# Patient Record
(demographics unavailable — no encounter records)

---

## 2024-12-30 NOTE — HISTORY OF PRESENT ILLNESS
[Right] : right hand dominant [FreeTextEntry1] : DOI: 12/28/24 DIONISIO: Fall on ice   Patient explains that early morning of the 28th he slipped on some ice outside his home falling on to his right hand.  He had immediate pain in the right wrist.  He went to the [facility] emergency department where x-rays revealed an anterior lunate dislocation.  A reduction was attempted by the emergency department physicians.  They report that they were able to feel the fracture reduced however they were unable to hold it reduced in a splint and so it redislocated.  He presents today in a sugar-tong splint.  He reports that he has no numbness or tingling in the hand.  He just has pain and swelling in the wrist.  He reports a history of a triceps tendon avulsion on the right side which was repaired but has no history of surgery in this wrist.

## 2024-12-30 NOTE — PHYSICAL EXAM
[de-identified] :  General: No acute distress Respiratory: Nonlabored Cardiology: Warm and well-perfused  Right upper extremity Sugar-tong splint in place, no sharp edges Swelling of the hand Motor: AIN PIN U intact, median nerve intact as the patient is able to fire the APB, 5 out of 5 Sensation: No numbness or tingling in the hand Warm and well-perfused with brisk cap refill  [de-identified] :  3 views of the right wrist as well as a CT scan of the right wrist were obtained in the [facility] and see department and reviewed in clinic showing an anterior dislocation of the lunate, there are no fractures associated with the injury

## 2024-12-30 NOTE — ASSESSMENT
[FreeTextEntry1] : 70-year-old male with a right volar lunate dislocation    -Discussed diagnosis, natural history of condition, and treatment options -Explained that we need to fix this surgically and we need to do it in an urgent fashion.  For this reason I recommend surgery in the form of open reduction of the lunate dislocation and fixation with K wires.  I also recommend fixation of the associated SL and LT ligament disruptions using anchors and FiberWire sutures.  I explained that we will try to do the surgery through a dorsal only incision.  However if he develops any numbness in the median nerve between now and the date of surgery or if we are unable to reduce the lunate through a dorsal only incision we will then include a volar incision to release the carpal tunnel and help with the reduction of the lunate.  I explained the K wires will be buried underneath the skin and will remain in for 10 to 12 weeks.  He will also be immobilized during this interval.  In 10 to 12 weeks the K wires will be removed and wrist range of motion will be begun.  - Discussed risks, benefits, and alternatives to surgery which included but were not limited to infection, bleeding, wound healing issues, stiffness, need for further surgery, and nerve injury.  I explained that the outcomes after an injury like this are generally not great as some level of stiffness usually results.  Patient understands and would like to proceed. All questions answered.  Plan: Right lunate dislocation open reduction and fixation: Regional and sedation: Date of surgery 12/31/2024

## 2024-12-30 NOTE — PHYSICAL EXAM
[de-identified] :  General: No acute distress Respiratory: Nonlabored Cardiology: Warm and well-perfused  Right upper extremity Sugar-tong splint in place, no sharp edges Swelling of the hand Motor: AIN PIN U intact, median nerve intact as the patient is able to fire the APB, 5 out of 5 Sensation: No numbness or tingling in the hand Warm and well-perfused with brisk cap refill  [de-identified] :  3 views of the right wrist as well as a CT scan of the right wrist were obtained in the [facility] and see department and reviewed in clinic showing an anterior dislocation of the lunate, there are no fractures associated with the injury

## 2024-12-30 NOTE — HISTORY OF PRESENT ILLNESS
[Right] : right hand dominant [FreeTextEntry1] : DOI: 12/28/24 DIONISIO: Fall on ice   Patient explains that early morning of the 28th he slipped on some ice outside his home falling on to his right hand.  He had immediate pain in the right wrist.  He went to the [facility] emergency department where x-rays revealed an anterior lunate dislocation.  A reduction was attempted by the emergency department physicians.  They report that they were able to feel the fracture reduced however they were unable to hold it reduced in a splint and so it redislocated.  He presents today in a sugar-tong splint.  He reports that he has no numbness or tingling in the hand.  He just has pain and swelling in the wrist.  He reports a history of a triceps tendon avulsion on the right side which was repaired but has no history of surgery in this wrist.

## 2025-01-13 NOTE — HISTORY OF PRESENT ILLNESS
[de-identified] : DOS: 1/31/24 Surgery: right lunate dislocation repair  [de-identified] : Patient feels well.  Pain has improved.  He still has swelling in his fingers but is overall improving.  He has no numbness or tingling in the hands. [de-identified] : Splint removed, incision clean dry and intact, nylon sutures removed today, no erythema, expected postoperative swelling of the digits, no numbness or tingling in the median nerve distribution, thenar musculature 5 out of 5 in the APB [de-identified] : 2 views of the right wrist were obtained and reviewed in clinic today showing maintained reduction of the lunate and good placement of the hardware, also good overall wrist alignment [de-identified] : Patient is doing very well status post right lunate dislocation repair.  A short arm cast was applied today and was well-padded.  It will remain on for 3 months at which point we will return to the operating room for cast removal and pin removal.  I explained that we can expect some stiffness after the pins were removed but he will be able to work through that with therapy.  Will return in 4 weeks for repeat evaluation.  OT prescription sent to work on shoulder elbow and finger range of motion.  Again advised patient to be nonweightbearing to the right upper extremity.  All questions answered patient in agreement.

## 2025-02-13 NOTE — HISTORY OF PRESENT ILLNESS
[de-identified] : DOS: 12/31/24 Surgery: right lunate dislocation repair and stabilization [de-identified] : Patient is doing very well, he has been going to hand therapy and has been ranging the fingers regularly.  Feels the swelling has significantly decreased.  His pain is well-controlled in the cast [de-identified] : Cast is intact, there are no sharp edges, he can extend the fingers fully and make a full fist, there is no numbness or tingling.  Median nerve AIN PIN ulnar nerves all motor intact [de-identified] : 2 views of the right wrist were obtained in clinic and reviewed showing maintained lunate reduction and good hardware placement [de-identified] : Overall patient is doing very well since surgery [de-identified] : Continue with cast precautions nonweightbearing and regular digit range of motion.  I explained we should plan for pin removal on 3/26/2025 as this will be right at the 3-month nuria since surgery.  He will return the week of March 10 which will be 2 weeks before that surgery.  At that point I will plan to remove the cast and transition him to a removable wrist brace which she will wear up until the surgery.  All questions answered patient agreed with the plan

## 2025-03-11 NOTE — HISTORY OF PRESENT ILLNESS
[de-identified] : DOS: 12/31/24 Surgery: right lunate dislocation repair and stabilization. [de-identified] : Patient is doing very well.  He is been continuing with his at home finger range of motion exercises and reports improved range of motion.  He has no numbness or tingling.  No issues [de-identified] : Cast removed incision is completely healed there is some mild dryness of the skin.  He can make a full fist and extend all fingers.  K wire is palpable under the skin [de-identified] : 2 views of the right wrist were obtained showing maintenance of lunate reduction and good hardware placement.  There is some mild widening of the SL interval [de-identified] : Almost 3 months status post right lunate dislocation repair and pinning [de-identified] : Cast removed today and placed into a removable wrist brace.  Recommend keeping the brace on 24/7 but can remove to shower.  Recommend continued nonweightbearing to the right upper extremity.  Will plan for pin removal in the OR on 3/26/2025 at which point we will then work on therapy for wrist range of motion.  I explained that the wrist will be stiff when the pins come out and extensive therapy will be needed.  Patient understands and we will proceed with surgical intervention on the 26th

## 2025-03-17 NOTE — HISTORY OF PRESENT ILLNESS
[de-identified] : DOS: 12/31/24 Surgery: right lunate dislocation repair and stabilization. [de-identified] : Patient returns as he has been having some irritation over the pin sites and the radial side of the thumb.  Noticed some redness and swelling of the hand.  Is scheduled for pin removal next week [de-identified] : Make a full fist and extend all fingers, dorsal incision is entirely healed with no signs of infection.  Ulnar side of wrist over the pin sites shows no skin issue.  Radial side of wrist over the 2 pin sites does show some erythema and wound formation but no drainage and skin is overall intact.  He can range the thumb without deficit [de-identified] : 71-year-old male almost 3 months status post lunate dislocation open reduction and pinning [de-identified] : Seems the splint we put him in was rubbing the site over the pins which is exacerbated some skin issue.  For this reason a volar slab was created and placed today.  Recommend twice daily bacitracin and dry dressing changes to this area.  Recommend regular digit range of motion.  I do not feel antibiotics are indicated at this time.  We will go ahead with pin removal next week and get moving.  All questions answered patient agreed with the plan

## 2025-04-09 NOTE — HISTORY OF PRESENT ILLNESS
[de-identified] : DOS: 12/31/24 Surgery: right lunate dislocation repair and stabilization.  DOS: 3/26/25 Surgery: removal of hardware [de-identified] : Patient doing well since removal of hardware.  He has already started therapy and is working on range of motion.  He is endorsing stiffness in the wrist as expected but reports the swelling and pain have slowly been improving.  No numbness or tingling and overall feels well [de-identified] : Incisions are well-healed with no signs of infection.  The sutures were removed today.  He can make a full fist and extend all fingers without any deficit.  He has no numbness or tingling the hand is warm and well-perfused.  His wrist range of motion is very limited at this point with less than 5 degrees of motion in flexion extension.  He can supinate just past neutral and pronate about 50 degrees [de-identified] : 2 views of the right wrist were obtained and reviewed in clinic showing maintained reduction of the lunate, there is some widening of the SL interval and some baseline intercarpal arthritis [de-identified] : Patient is doing well since removal of hardware [de-identified] : As discussed before his first surgery stiffness is expected after this kind of injury.  At this point he can return to all activities as tolerated but advised to go slow.  He should work with occupational therapy to get as much range of motion back as possible.  Explained sometimes during this process there can be increases in pain and swelling for which she should take ice heat Tylenol and Motrin as needed.  Patient return in 2 months for repeat evaluation.  All questions answered patient in agreement with the plan

## 2025-04-22 NOTE — HISTORY OF PRESENT ILLNESS
[de-identified] : DOS: 12/31/24 Surgery: right lunate dislocation repair and stabilization.  DOS: 3/26/25 Surgery: removal of hardware.   [de-identified] : Patient returns given recent swelling of the wrist.  He reports that he has been going to therapy and using the wrist as much as he can.  He feels that about a week ago he overdid it by using the wrist a little too much and then the whole wrist and hand swelled up.  Prior to this episode he reports that his range of motion was really improving and he was feeling great.  The swelling has started to subside but he does have an area of redness on the dorsal aspect of the wrist.  No numbness or tingling and no new symptoms [de-identified] : Incisions have healed well.  Incisions over the pin sites show no signs of infection.  Just around the distal aspect of the dorsal incision there is a localized area of redness and a very superficial collection which may indicate localized infection.  He has no numbness or tingling.  He can get hit the tips of his fingers to about 1.5 cm from the palm.  His wrist range of motion is about 10 degrees of extension to 10 degrees of flexion his pronation is about 50 and his supination is about 50 as well.  Warm well-perfused [de-identified] : 3 views of the right wrist were obtained and reviewed in clinic showing maintained lunate position and no change from prior x-rays [de-identified] : Overall doing well but may have the beginnings of a superficial infection dorsally.  I suspect the recent swelling that he had was likely from breaking through some scar and unrelated to this superficial infection. [de-identified] : Doxycycline prescribed for 2 weeks to see if we get the infection under control.  He should continue ranging the wrist and hand is much as tolerated and going to therapy as his x-rays look great.  He will return in 2 weeks for repeat evaluation.  I explained that if we are not able to get the infection under control with antibiotics alone we may need to do a superficial I&D.  However there is no drainable collection at this point and I think we are in the very early stages I am hopeful antibiotics for 2 weeks will get things under control.  Also explained that if there is any worsening he should come back to see me sooner.  Patient understands and I will see him in 2 weeks

## 2025-05-06 NOTE — HISTORY OF PRESENT ILLNESS
[de-identified] : DOS: 12/31/24 Surgery: right lunate dislocation repair and stabilization.  DOS: 3/26/25 Surgery: removal of hardware.   [de-identified] : Patient returns and is feeling better.  He is completed his antibiotic course and no longer has any redness or swelling about the wrist.  He is continue to use the hand is much as tolerated overall his pain is improving.  He still has some swelling and stiffness as expected [de-identified] : Incisions have healed well.  There is no sign of infection and the prior collection has resolved. he has no numbness or tingling.  He can get hit the tips of his fingers to about 1.5 cm from the palm.  His wrist range of motion is about 10 degrees of extension to 10 degrees of flexion his pronation is about 50 and his supination is about 50 as well.  Warm well-perfused [de-identified] : 3 views of the right wrist were obtained and reviewed in clinic showing maintained lunate position and no change from prior x-rays [de-identified] : Overall doing well and continues to improve with no signs of infection today [de-identified] : Continue with physical therapy and using the wrist as much as tolerated.  I again explained that sometimes stiffness after the surgery can persist for quite some time and is very common.  We will continue to monitor his range of motion progress.  Will return in 2 months for repeat evaluation

## 2025-05-16 NOTE — HISTORY OF PRESENT ILLNESS
[de-identified] : DOS: 12/31/24 Surgery: right lunate dislocation repair and stabilization.  DOS: 3/26/25 Surgery: removal of hardware.   [de-identified] : Patient returns for repeat evaluation.  He reports that over the last few weeks he was doing very well and his swelling had gone down quite a bit and his range of motion was improving.  However 2 days ago he started getting swelling throughout the wrist and a localized red collection dorsally in the same place he had it previously.  He has no fevers or chills and no pain.  He reports over the last 2 days his motion has decreased [de-identified] : Incisions have healed well however dorsally in the area of the incision there is a localized superficial abscess.  There is no streaking redness proximally.  There is diffuse swelling throughout the wrist and fingers.  He is able to flex and extend all digits with some stiffness due to the swelling.  His wrist range of motion is limited at this point given the swelling [de-identified] : 3 views of the right wrist were obtained and reviewed in clinic showing maintained lunate position and no change from prior x-rays [de-identified] : Patient has a superficial abscess dorsally in the side of the incision and needs to be drained today. [de-identified] : After proceeding with informed consent 10 cc of 1% lidocaine was injected on the dorsal wrist just proximal to the abscess.  Once the local headset in the site over the abscess was cleaned off with Betadine.  An 11 blade was taken to make a short longitudinal incision in the abscess.  A clamp was inserted and blunt dissection was taken around the abscess.  Purulent fluid was expressed and 2 cultures were taken and sent to the lab.  Once no purulence could be further expressed the hand was run under water for 60 seconds to help irrigate out any remaining infection.  With use of the clamp there was no tracking courses of the infection and it all was very superficial.  The wound was packed and a sterile dressing was placed.  The plan will be for the patient to take doxycycline for 30 days.  Previously the doxycycline got the infection under control so I think it is the appropriate antibiotic I think this just returned because there was a pocket of infection that needed to be drained.  I am hopeful that with the drainage today this will resolve the infection.  He will return in 2 weeks for repeat evaluation.  I explained if the infection comes back we may need to go back to the operating room for a more formal washout.  Patient will perform daily dressing changes and remove the packing in 24 hours.  All questions answered patient agreement with the plan

## 2025-05-29 NOTE — PHYSICAL EXAM
[de-identified] : An exam today he has no further redness or swelling. The side of the prior I&D is healed over and there are no further collections palpable. He can make a full fist and extend all finger fingers. His wrist range of motion is about 10 reflection and 10 of extension he has about 5 of radio and all deviation. He has no pain on palpation throughout. He has no numbness or tingling.  Regarding the right shoulder, his flexion is 295. His abduction is to 95. His internal rotation is to L5 and his external rotation is to 70. He has a positive Mclaughlin and positive Neer test. There is a negative speeds test.   [de-identified] : Three views of the right shoulder were obtained reviewed in clinic showing no fracture dislocations there is AC joint arthritis and mild glenohumeral joint arthritis with mild proximal translation of the proximal humerus

## 2025-05-29 NOTE — HISTORY OF PRESENT ILLNESS
[FreeTextEntry1] : DOS: 12/31/24 Surgery: right lunate dislocation repair and stabilization.  DOS: 3/26/25 Surgery: removal of hardware.  Patient is doing much better now since his last visit when we I & D'd the infection and started him on doxycycline. The swelling and redness has resolved in his range of motion has improved. He has no pain and overall feels very well.  Patient also complains of right sided shoulder pain, which has been going on for several years and he would like evaluated today. It is worse with overhead activities and reaching behind his back. He has never had a treated and doesn't recall any specific injury.

## 2025-05-29 NOTE — ASSESSMENT
[FreeTextEntry1] : 71M s/p   DOS: 12/31/24 Surgery: right lunate dislocation repair and stabilization.  DOS: 3/26/25 Surgery: removal of hardware.  Also with right rotator cuff tendinitis  Regarding the infection, it is controlled at this point and hopefully entirely resolved. I recommend continuing his doxycycline for the next two weeks and then we will reevaluate. If the infection returns after this anabiotic course, we may have to proceed with a formal surgery and IV antibiotics. However, he is improving well and I'm hopeful that this will have resolved the infection. He can continue to range the wrist as tolerated and continue to work with occupational therapy. Regarding the shoulder, I explained that he has rotator cuff tendinitis and we could treat this down the line with a steroid injection and physical therapy. At this time, patient would like to focus on the wrist, but is happy to know what is occurring in the shoulder. We return in four weeks time.

## 2025-06-24 NOTE — HISTORY OF PRESENT ILLNESS
[FreeTextEntry1] : DOS: 12/31/24 Surgery: right lunate dislocation repair and stabilization.  DOS: 3/26/25 Surgery: removal of hardware.  Patient returns for repeat evaluation of the wrist.  Since the prior I&D and the doxycycline his symptoms entirely went away but then 2 weeks after finishing the doxycycline redness swelling and a dorsal abscess have returned.

## 2025-06-24 NOTE — ASSESSMENT
[FreeTextEntry1] : 71M s/p   DOS: 12/31/24 Surgery: right lunate dislocation repair and stabilization.  DOS: 3/26/25 Surgery: removal of hardware.  Patient has an infection in his wrist after the above surgeries.  This has failed to respond to antibiotics and a local I&D.  For this reason I recommend a more formal I&D in the operating room to washout the infection.  I would need an MRI prior to the surgery to assess for any collections to make sure we do not miss anything in the surgery.  I would also like him to see one of our infectious disease colleagues to help guide antibiotic management.  Information for infectious disease was provided and he will see them 6/24/2025.  I discussed the risk benefits and alternatives of surgery including further infection wound issues bony involvement and need for further surgery.  Patient understands and wishes to proceed.  We will plan for surgery next Wednesday.

## 2025-06-24 NOTE — PHYSICAL EXAM
[de-identified] : On exam today there is a localized collection dorsally in the area of the prior I&D.  There is redness associated with it.  There is swelling about the wrist.  He can make a full fist and extend all fingers but he has some pain with wrist range of motion.  He is neurovascular intact

## 2025-07-15 NOTE — HISTORY OF PRESENT ILLNESS
[Doing Well] : is doing well [Excellent Pain Control] : has excellent pain control [No Sign of Infection] : is showing no signs of infection [de-identified] : DOS: 12/31/24 Surgery: right lunate dislocation repair and stabilization.  DOS: 3/26/25 Surgery: removal of hardware.  DOS: 7/2/25 Surgery: right wrist I&D [de-identified] : Patient is doing very well since his most recent surgery.  He does not have any fevers or chills and the swelling is improving.  He is still getting his IV antibiotics per his infectious disease physician.  Overall he feels well [de-identified] : Incision is healing well no signs of infection.  Sutures removed today.  He can make a full fist and extend all fingers and is no numbness or tingling in the hands.  His wrist range of motion is about 20 degrees of extension to 20 degrees of flexion.  Is warm and well-perfused.  Pronation supination is equivalent to the contralateral side [de-identified] : 2 views of the right wrist were obtained and reviewed in clinic today showing maintained reduction of the scaphoid lunate interval and continued good placement of the screw in the dorsal aspect of the lunate.  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  At this point he can return to all activities as tolerated continue with occupational therapy.  The key now will be letting the antibiotics work.  Cultures from the OR are growing Staph epidermidis.  He will follow-up with his infectious disease doctor tomorrow who will further guide antibiotic management.  Patient will return for repeat evaluation in 4 weeks